# Patient Record
Sex: FEMALE | Race: WHITE | URBAN - NONMETROPOLITAN AREA
[De-identification: names, ages, dates, MRNs, and addresses within clinical notes are randomized per-mention and may not be internally consistent; named-entity substitution may affect disease eponyms.]

---

## 2019-08-21 ENCOUNTER — OFFICE VISIT (OUTPATIENT)
Dept: FAMILY MEDICINE | Facility: OTHER | Age: 59
End: 2019-08-21
Attending: NURSE PRACTITIONER
Payer: COMMERCIAL

## 2019-08-21 VITALS
DIASTOLIC BLOOD PRESSURE: 70 MMHG | HEART RATE: 80 BPM | SYSTOLIC BLOOD PRESSURE: 132 MMHG | BODY MASS INDEX: 36.91 KG/M2 | TEMPERATURE: 98.5 F | HEIGHT: 63 IN | RESPIRATION RATE: 17 BRPM | OXYGEN SATURATION: 96 % | WEIGHT: 208.3 LBS

## 2019-08-21 DIAGNOSIS — N30.00 ACUTE CYSTITIS WITHOUT HEMATURIA: ICD-10-CM

## 2019-08-21 DIAGNOSIS — R39.89 URINARY PROBLEM: Primary | ICD-10-CM

## 2019-08-21 LAB
ALBUMIN UR-MCNC: 100 MG/DL
APPEARANCE UR: ABNORMAL
BACTERIA #/AREA URNS HPF: ABNORMAL /HPF
BILIRUB UR QL STRIP: ABNORMAL
COLOR UR AUTO: YELLOW
GLUCOSE UR STRIP-MCNC: NEGATIVE MG/DL
HGB UR QL STRIP: ABNORMAL
KETONES UR STRIP-MCNC: NEGATIVE MG/DL
LEUKOCYTE ESTERASE UR QL STRIP: ABNORMAL
NITRATE UR QL: POSITIVE
PH UR STRIP: 6 PH (ref 5–9)
RBC #/AREA URNS AUTO: >100 /HPF
SOURCE: ABNORMAL
SP GR UR STRIP: >1.03 (ref 1–1.03)
UROBILINOGEN UR STRIP-ACNC: 1 EU/DL (ref 0.2–1)
WBC #/AREA URNS AUTO: >100 /HPF

## 2019-08-21 PROCEDURE — 81001 URINALYSIS AUTO W/SCOPE: CPT | Mod: ZL | Performed by: NURSE PRACTITIONER

## 2019-08-21 PROCEDURE — 87088 URINE BACTERIA CULTURE: CPT | Mod: ZL | Performed by: NURSE PRACTITIONER

## 2019-08-21 PROCEDURE — 87086 URINE CULTURE/COLONY COUNT: CPT | Mod: ZL | Performed by: NURSE PRACTITIONER

## 2019-08-21 PROCEDURE — 99203 OFFICE O/P NEW LOW 30 MIN: CPT

## 2019-08-21 RX ORDER — FLUTICASONE PROPIONATE 110 UG/1
2 AEROSOL, METERED RESPIRATORY (INHALATION) 2 TIMES DAILY
COMMUNITY
Start: 2019-07-30

## 2019-08-21 RX ORDER — LEVOTHYROXINE SODIUM 75 UG/1
75 TABLET ORAL DAILY
COMMUNITY
Start: 2019-07-09

## 2019-08-21 RX ORDER — PREDNISONE 10 MG/1
40 TABLET ORAL DAILY
COMMUNITY
Start: 2019-07-30

## 2019-08-21 RX ORDER — SULFAMETHOXAZOLE/TRIMETHOPRIM 800-160 MG
1 TABLET ORAL 2 TIMES DAILY
Qty: 6 TABLET | Refills: 0 | Status: SHIPPED | OUTPATIENT
Start: 2019-08-21 | End: 2019-08-24

## 2019-08-21 RX ORDER — ALBUTEROL SULFATE 90 UG/1
1-2 AEROSOL, METERED RESPIRATORY (INHALATION) EVERY 4 HOURS PRN
COMMUNITY
Start: 2019-07-09

## 2019-08-21 ASSESSMENT — PAIN SCALES - GENERAL: PAINLEVEL: NO PAIN (0)

## 2019-08-21 ASSESSMENT — MIFFLIN-ST. JEOR: SCORE: 1483.97

## 2019-08-22 NOTE — PATIENT INSTRUCTIONS
"Patient Education     Bladder Infection, Female (Adult)  Urine is normally doesn't have any bacteria in it. But bacteria can get into the urinary tract from the skin around the rectum. Or they can travel in the blood from elsewhere in the body. Once they are in your urinary tract, they can cause infection in the urethra (urethritis), the bladder (cystitis), or the kidneys (pyelonephritis).  The most common place for an infection is in the bladder. This is called a bladder infection. This is one of the most common infections in women. Most bladder infections are easily treated. They are not serious unless the infection spreads to the kidney.  The phrases \"bladder infection,\" \"UTI,\" and \"cystitis\" are often used to describe the same thing. But they are not always the same. Cystitis is an inflammation of the bladder. The most common cause of cystitis is an infection.  Symptoms  The infection causes inflammation in the urethra and bladder. This causes many of the symptoms. The most common symptoms of a bladder infection are:    Pain or burning when urinating    Having to urinate more often than usual    Urgent need to urinate    Only a small amount of urine comes out    Blood in urine    Abdominal discomfort. This is usually in the lower abdomen above the pubic bone.    Cloudy urine    Strong- or bad-smelling urine    Unable to urinate (urinary retention)    Unable to hold urine in (urinary incontinence)    Fever    Loss of appetite    Confusion (in older adults)  Causes  Bladder infections are not contagious. You can't get one from someone else, from a toilet seat, or from sharing a bath.  The most common cause of bladder infections is bacteria from the bowels. The bacteria get onto the skin around the opening of the urethra. From there, they can get into the urine and travel up to the bladder, causing inflammation and infection. This usually happens because of:    Wiping improperly after urinating. Always wipe from " front to back.    Bowel incontinence    Pregnancy    Procedures such as having a catheter inserted    Older age    Not emptying your bladder. This can allow bacteria a chance to grow in your urine.    Dehydration    Constipation    Sex    Use of a diaphragm for birth control   Treatment  Bladder infections are diagnosed by a urine test. They are treated with antibiotics and usually clear up quickly without complications. Treatment helps prevent a more serious kidney infection.  Medicines  Medicines can help in the treatment of a bladder infection:    Take antibiotics until they are used up, even if you feel better. It is important to finish them to make sure the infection has cleared.    You can use acetaminophen or ibuprofen for pain, fever, or discomfort, unless another medicine was prescribed. If you have chronic liver or kidney disease, talk with your healthcare provider before using these medicines. Also talk with your provider if you've ever had a stomach ulcer or gastrointestinal bleeding, or are taking blood-thinner medicines.    If you are given phenazopydridine to reduce burning with urination, it will cause your urine to become a bright orange color. This can stain clothing.  Care and prevention  These self-care steps can help prevent future infections:    Drink plenty of fluids to prevent dehydration and flush out your bladder. Do this unless you must restrict fluids for other health reasons, or your doctor told you not to.    Proper cleaning after going to the bathroom is important. Wipe from front to back after using the toilet to prevent the spread of bacteria.    Urinate more often. Don't try to hold urine in for a long time.    Wear loose-fitting clothes and cotton underwear. Avoid tight-fitting pants.    Improve your diet and prevent constipation. Eat more fresh fruit and vegetables, and fiber, and less junk and fatty foods.    Avoid sex until your symptoms are gone.    Avoid caffeine, alcohol, and  spicy foods. These can irritate your bladder.    Urinate right after intercourse to flush out your bladder.    If you use birth control pills and have frequent bladder infections, discuss it with your doctor.  Follow-up care  Call your healthcare provider if all symptoms are not gone after 3 days of treatment. This is especially important if you have repeat infections.  If a culture was done, you will be told if your treatment needs to be changed. If directed, you can call to find out the results.  If X-rays were done, you will be told if the results will affect your treatment.  Call 911  Call 911 if any of the following occur:    Trouble breathing    Hard to wake up or confusion    Fainting or loss of consciousness    Rapid heart rate  When to seek medical advice  Call your healthcare provider right away if any of these occur:    Fever of 100.4 F (38.0 C) or higher, or as directed by your healthcare provider    Symptoms are not better by the third day of treatment    Back or belly (abdominal) pain that gets worse    Repeated vomiting, or unable to keep medicine down    Weakness or dizziness    Vaginal discharge    Pain, redness, or swelling in the outer vaginal area (labia)

## 2019-08-22 NOTE — PROGRESS NOTES
"Nursing Notes:   Skylar Waldron LPN  2019  7:04 PM  Signed  Chief Complaint   Patient presents with     Urinary Problem     Patient is here for pain upon urination that started this morning. Patient states that she is also having frequency and urgency. Patient states she used an AZO brand test for UTI that states it was positive. Patient thinks urine looks like it has blood in it.     Initial /70   Pulse 80   Temp 98.5  F (36.9  C) (Tympanic)   Resp 17   Ht 1.6 m (5' 3\")   Wt 94.5 kg (208 lb 4.8 oz)   SpO2 96%   BMI 36.90 kg/m    Estimated body mass index is 36.9 kg/m  as calculated from the following:    Height as of this encounter: 1.6 m (5' 3\").    Weight as of this encounter: 94.5 kg (208 lb 4.8 oz).  Medication Reconciliation: complete    Skylar Waldron LPN    SUBJECTIVE:   Melissa Omalley is a 59 year old female who presents to clinic today for the following health issues:    [facility] symptoms.  Symptoms started this morning.  She is having pain with urination, frequency and urgency.  She has noted mild blood on the toilet paper.  There is no fevers, chills, abdominal pain, back pain.  She denies history of kidney stones.  She has had a history of UTIs.      Problem list and histories reviewed & adjusted, as indicated.  Additional history: as documented    Patient Active Problem List   Diagnosis     Anxiety     CARDIOVASCULAR SCREENING; LDL GOAL LESS THAN 130     Migraine headache     Obesity (BMI 30-39.9)     Anal fissure     Ménière's disease     Abdominal pain, unspecified abdominal location     Numbness and tingling     Past Surgical History:   Procedure Laterality Date     BACK SURGERY       BUNIONECTOMY       C/SECTION, CLASSICAL      , Classical     C/SECTION, CLASSICAL      , Classical     HYSTERECTOMY, PAP NO LONGER INDICATED       HYSTERECTOMY, ASHER      ovary cysts and bleeding     PROCTOPLASTY  10/13/2012    Procedure: " PROCTOPLASTY;   proctoplasty, to repair anal stenosis;  Surgeon: Goldberg, Stanley Morton, MD;  [facility]: [facility]     TONSILLECTOMY         Social History     Tobacco Use     Smoking status: Never Smoker     Smokeless tobacco: Never Used   Substance Use Topics     Alcohol use: No     Family History   Problem Relation Age of Onset     Diabetes Mother      Hypertension Mother      Diabetes Father      Hypertension Father          Current Outpatient Medications   Medication Sig Dispense Refill     albuterol (PROAIR HFA/PROVENTIL HFA/VENTOLIN HFA) 108 (90 Base) MCG/ACT inhaler Inhale 1-2 puffs into the lungs every 4 hours as needed       ALPRAZolam (XANAX) 0.25 MG tablet Take 1 tablet (0.25 mg) by mouth 3 times daily as needed 30 tablet 0     Calcium Carbonate-Vitamin D (CALCIUM 600 + D OR) Take  by mouth.       Cyanocobalamin (VITAMIN B 12 PO) Take 1 tablet by mouth daily       cyclobenzaprine (FLEXERIL) 10 MG tablet Take 1 tablet (10 mg) by mouth 3 times daily as needed for muscle spasms 90 tablet 0     fluticasone (CUTIVATE) 0.05 % cream Apply topically 2 times daily 15 g 0     fluticasone (FLONASE) 50 MCG/ACT nasal spray Spray 2 sprays into both nostrils daily 1 Package 2     fluticasone (FLOVENT HFA) 110 MCG/ACT inhaler Inhale 2 puffs into the lungs 2 times daily       ketoconazole (NIZORAL) 2 % cream Apply bid if needed 45 g 0     levothyroxine (SYNTHROID/LEVOTHROID) 75 MCG tablet Take 75 mcg by mouth daily       Multiple Vitamins-Iron (MULTIVITAMIN/IRON) TABS Take  by mouth.       predniSONE (DELTASONE) 10 MG tablet Take 40 mg by mouth daily       sulfamethoxazole-trimethoprim (BACTRIM DS) 800-160 MG tablet Take 1 tablet by mouth 2 times daily for 3 days 6 tablet 0     meclizine (ANTIVERT) 25 MG tablet Take 25 mg by mouth 3 times daily as needed.       sumatriptan (IMITREX) 100 MG tablet Take 1 tablet by mouth at onset of headache for migraine. May repeat dose in 2 hours.  Do not exceed 200 mg in 24 hours (Patient  "not taking: Reported on 8/21/2019) 6 tablet 1     triamcinolone (KENALOG) 0.1 % cream Apply topically 2 times daily Not to be used more then 10-14 consecutive days, not to be used on face or groin (Patient not taking: Reported on 8/21/2019) 15 g 0     triamterene-hydrochlorothiazide (MAXZIDE-25) 37.5-25 MG per tablet Take 1 tablet by mouth daily (Patient not taking: Reported on 8/21/2019) 90 tablet 1     Zinc 50 MG TABS Take  by mouth.       Allergies   Allergen Reactions     Adhesive Tape      Surgical tape causes blisters     Liquid Adhesive Dermatitis     Surgical tape     Nitroglycerin Other (See Comments)     Facial numbness Rectal ointment for      No Clinical Screening - See Comments      PN: SURGICAL TAPE Reaction :         ROS:  Notable findings in the HPI.       OBJECTIVE:     /70   Pulse 80   Temp 98.5  F (36.9  C) (Tympanic)   Resp 17   Ht 1.6 m (5' 3\")   Wt 94.5 kg (208 lb 4.8 oz)   SpO2 96%   BMI 36.90 kg/m    Body mass index is 36.9 kg/m .  GENERAL: healthy, alert and no distress  EYES: Eyes grossly normal to inspection  RESP: Without increased work of breathing   CV: regular rates and rhythm and no peripheral edema  ABDOMEN: soft, nontender, without hepatosplenomegaly or masses and bowel sounds normal  BACK: no CVA tenderness, no paralumbar tenderness  PSYCH: mentation appears normal, affect normal/bright    Diagnostic Test Results:  Results for orders placed or performed in visit on 08/21/19 (from the past 24 hour(s))   Urinalysis w Reflex Microscopic If Positive   Result Value Ref Range    Color Urine Yellow     Appearance Urine Cloudy     Glucose Urine Negative NEG^Negative mg/dL    Bilirubin Urine Small (A) NEG^Negative    Ketones Urine Negative NEG^Negative mg/dL    Specific Gravity Urine >1.030 (H) 1.000 - 1.030    Blood Urine Large (A) NEG^Negative    pH Urine 6.0 5.0 - 9.0 pH    Protein Albumin Urine 100 (A) NEG^Negative mg/dL    Urobilinogen Urine 1.0 0.2 - 1.0 EU/dL    Nitrite " Urine Positive (A) NEG^Negative    Leukocyte Esterase Urine Moderate (A) NEG^Negative    Source Midstream Urine    Urine Microscopic   Result Value Ref Range    WBC Urine >100 (A) OTO5^0 - 5 /HPF    RBC Urine >100 (A) OTO2^O - 2 /HPF    Bacteria Urine Many (A) NEG^Negative /HPF       ASSESSMENT/PLAN:     1. Urinary problem  - Urinalysis w Reflex Microscopic If Positive  - Urine Microscopic    2. Acute cystitis without hematuria  - Urine Culture Aerobic Bacterial  - sulfamethoxazole-trimethoprim (BACTRIM DS) 800-160 MG tablet; Take 1 tablet by mouth 2 times daily for 3 days  Dispense: 6 tablet; Refill: 0      PLAN:    UTI Adult:  NO Sulfa Allergy: Septra DS one tablet twice daily for 3 days  - Ensure you are drinking plenty of fluids, emptying your bladder when you feel the urge versus holding urine, after urinating you can bear-down to empty bladder completely, after peeing wipe front to back to avoid introducing bacteria towards vaginal area.   - Call or return to clinic prn if these symptoms worsen or fail to improve as anticipated.      Followup:    If not improving or if condition worsens, follow up with your Primary Care Provider    I explained my diagnostic considerations and recommendations to the patient, who voiced understanding and agreement with the treatment plan. All questions were answered. We discussed potential side effects of any prescribed or recommended therapies, as well as expectations for response to treatments. She was advised to contact our office if there is no improvement or worsening of conditions or symptoms.  If s/s worsen or persist, patient will either come back or follow up with PCP.    Disclaimer:  This note consists of words and symbols derived from keyboarding, dictation, or using voice recognition software. As a result, there may be errors in the script that have gone undetected. Please consider this when interpreting information found in this note.      Jo Hernandez NP,  8/21/2019 7:04 PM

## 2019-08-23 LAB
BACTERIA SPEC CULT: ABNORMAL
SPECIMEN SOURCE: ABNORMAL

## 2019-09-27 ENCOUNTER — HOSPITAL ENCOUNTER (EMERGENCY)
Facility: OTHER | Age: 59
Discharge: HOME | End: 2019-09-27
Attending: EMERGENCY MEDICINE | Admitting: EMERGENCY MEDICINE
Payer: COMMERCIAL

## 2019-09-27 VITALS
BODY MASS INDEX: 35.97 KG/M2 | DIASTOLIC BLOOD PRESSURE: 82 MMHG | RESPIRATION RATE: 16 BRPM | OXYGEN SATURATION: 94 % | SYSTOLIC BLOOD PRESSURE: 134 MMHG | TEMPERATURE: 98 F | HEIGHT: 63 IN | WEIGHT: 203 LBS

## 2019-09-27 DIAGNOSIS — R30.0 DYSURIA: ICD-10-CM

## 2019-09-27 LAB
ALBUMIN UR-MCNC: NEGATIVE MG/DL
APPEARANCE UR: CLEAR
BILIRUB UR QL STRIP: NEGATIVE
COLOR UR AUTO: YELLOW
GLUCOSE UR STRIP-MCNC: NEGATIVE MG/DL
HGB UR QL STRIP: NEGATIVE
KETONES UR STRIP-MCNC: NEGATIVE MG/DL
LEUKOCYTE ESTERASE UR QL STRIP: NEGATIVE
NITRATE UR QL: NEGATIVE
PH UR STRIP: 7 PH (ref 5–9)
SOURCE: NORMAL
SP GR UR STRIP: 1.01 (ref 1–1.03)
UROBILINOGEN UR STRIP-ACNC: 0.2 EU/DL (ref 0.2–1)

## 2019-09-27 PROCEDURE — 99282 EMERGENCY DEPT VISIT SF MDM: CPT | Mod: Z6 | Performed by: EMERGENCY MEDICINE

## 2019-09-27 PROCEDURE — 99283 EMERGENCY DEPT VISIT LOW MDM: CPT | Performed by: EMERGENCY MEDICINE

## 2019-09-27 PROCEDURE — 81003 URINALYSIS AUTO W/O SCOPE: CPT | Performed by: EMERGENCY MEDICINE

## 2019-09-27 ASSESSMENT — MIFFLIN-ST. JEOR: SCORE: 1459.93

## 2019-09-27 NOTE — ED AVS SNAPSHOT
[facility]  [address]  [place] [address]  Phone:  [phone]  Fax:  [phone]                                    Melissa Omalley   MRN: [id number]    Department:  [facility]   Date of Visit:  9/27/2019           After Visit Summary Signature Page    I have received my discharge instructions, and my questions have been answered. I have discussed any challenges I see with this plan with the nurse or doctor.    ..........................................................................................................................................  Patient/Patient Representative Signature      ..........................................................................................................................................  Patient Representative Print Name and Relationship to Patient    ..................................................               ................................................  Date                                   Time    ..........................................................................................................................................  Reviewed by Signature/Title    ...................................................              ..............................................  Date                                               Time          22EPIC Rev 08/18

## 2019-09-28 NOTE — ED TRIAGE NOTES
Pt presents to the ER complaining of urinary frequency and burning so she would like to be tested for a UTI.

## 2019-09-28 NOTE — ED PROVIDER NOTES
History     Chief Complaint   Patient presents with     Urinary Frequency     HPI  Melissa Omalley is a 59 year old female who is here concerned about UTI.  She is complaining of frequency and burning but no other symptoms.  Denies flank pain fevers chills nausea vomiting.  Not feeling ill otherwise.    Allergies:  Allergies   Allergen Reactions     Adhesive Tape      Surgical tape causes blisters     Liquid Adhesive Dermatitis     Surgical tape     Nitroglycerin Other (See Comments)     Facial numbness Rectal ointment for      No Clinical Screening - See Comments      PN: SURGICAL TAPE Reaction :       Problem List:    Patient Active Problem List    Diagnosis Date Noted     Abdominal pain, unspecified abdominal location 2013     Priority: Medium     Problem list name updated by automated process. Provider to review       Numbness and tingling 2013     Priority: Medium     Ménière's disease      Priority: Medium     Anal fissure 2012     Priority: Medium     Obesity (BMI 30-39.9) 2012     Priority: Medium     Migraine headache 2012     Priority: Medium     Anxiety 2011     Priority: Medium     CARDIOVASCULAR SCREENING; LDL GOAL LESS THAN 130 2011     Priority: Medium        Past Medical History:    Past Medical History:   Diagnosis Date     Anal fissure      Anxiety 2011     Migraine      Ménière's disease 2011     Obesity (BMI 30-39.9)        Past Surgical History:    Past Surgical History:   Procedure Laterality Date     BACK SURGERY       BUNIONECTOMY       C/SECTION, CLASSICAL      , Classical     C/SECTION, CLASSICAL      , Classical     HYSTERECTOMY, PAP NO LONGER INDICATED       HYSTERECTOMY, ASHER      ovary cysts and bleeding     PROCTOPLASTY  10/13/2012    Procedure: PROCTOPLASTY;   proctoplasty, to repair anal stenosis;  Surgeon: Goldberg, Stanley Morton, MD;  Location: [facility]     TONSILLECTOMY         Family History:    Family History  "  Problem Relation Age of Onset     Diabetes Mother      Hypertension Mother      Diabetes Father      Hypertension Father        Social History:  Marital Status:   [2]  Social History     Tobacco Use     Smoking status: Never Smoker     Smokeless tobacco: Never Used   Substance Use Topics     Alcohol use: No     Drug use: No        Medications:    albuterol (PROAIR HFA/PROVENTIL HFA/VENTOLIN HFA) 108 (90 Base) MCG/ACT inhaler  ALPRAZolam (XANAX) 0.25 MG tablet  Calcium Carbonate-Vitamin D (CALCIUM 600 + D OR)  Cyanocobalamin (VITAMIN B 12 PO)  cyclobenzaprine (FLEXERIL) 10 MG tablet  fluticasone (CUTIVATE) 0.05 % cream  fluticasone (FLONASE) 50 MCG/ACT nasal spray  fluticasone (FLOVENT HFA) 110 MCG/ACT inhaler  ketoconazole (NIZORAL) 2 % cream  levothyroxine (SYNTHROID/LEVOTHROID) 75 MCG tablet  meclizine (ANTIVERT) 25 MG tablet  Multiple Vitamins-Iron (MULTIVITAMIN/IRON) TABS  predniSONE (DELTASONE) 10 MG tablet  sumatriptan (IMITREX) 100 MG tablet  triamcinolone (KENALOG) 0.1 % cream  triamterene-hydrochlorothiazide (MAXZIDE-25) 37.5-25 MG per tablet  Zinc 50 MG TABS          Review of Systems   All other systems reviewed and are negative.      Physical Exam   BP: (!) 150/64  Heart Rate: 74  Temp: 98  F (36.7  C)  Resp: 16  Height: 160 cm (5' 3\")  Weight: 92.1 kg (203 lb)  SpO2: 95 %      Physical Exam  HENT:      Head: Normocephalic.   Eyes:      Conjunctiva/sclera: Conjunctivae normal.   Cardiovascular:      Rate and Rhythm: Normal rate.   Pulmonary:      Effort: Pulmonary effort is normal.   Skin:     General: Skin is warm and dry.   Neurological:      General: No focal deficit present.      Mental Status: She is alert and oriented to person, place, and time.   Psychiatric:         Mood and Affect: Mood normal.         Behavior: Behavior normal.         ED Course        Procedures               Results for orders placed or performed during the hospital encounter of 09/27/19 (from the past 24 hour(s)) "   *UA reflex to Microscopic   Result Value Ref Range    Color Urine Yellow     Appearance Urine Clear     Glucose Urine Negative NEG^Negative mg/dL    Bilirubin Urine Negative NEG^Negative    Ketones Urine Negative NEG^Negative mg/dL    Specific Gravity Urine 1.010 1.000 - 1.030    Blood Urine Negative NEG^Negative    pH Urine 7.0 5.0 - 9.0 pH    Protein Albumin Urine Negative NEG^Negative mg/dL    Urobilinogen Urine 0.2 0.2 - 1.0 EU/dL    Nitrite Urine Negative NEG^Negative    Leukocyte Esterase Urine Negative NEG^Negative    Source Midstream Urine        Medications - No data to display    Assessments & Plan (with Medical Decision Making)     I have reviewed the nursing notes.    I have reviewed the findings, diagnosis, plan and need for follow up with the patient.  Urine is clear with no sign of infection.  She is reassured and discharged home.  Follow-up if worse or not improving.    Discharge Medication List as of 9/27/2019 10:52 PM          Final diagnoses:   Dysuria       9/27/2019   [facility]     Mukund Majano MD  09/28/19 0650       Mukund Majano MD  09/28/19 0786

## 2019-10-01 ENCOUNTER — HEALTH MAINTENANCE LETTER (OUTPATIENT)
Age: 59
End: 2019-10-01

## 2021-01-15 ENCOUNTER — HEALTH MAINTENANCE LETTER (OUTPATIENT)
Age: 61
End: 2021-01-15

## 2021-09-04 ENCOUNTER — HEALTH MAINTENANCE LETTER (OUTPATIENT)
Age: 61
End: 2021-09-04

## 2021-10-30 ENCOUNTER — HEALTH MAINTENANCE LETTER (OUTPATIENT)
Age: 61
End: 2021-10-30

## 2022-02-19 ENCOUNTER — HEALTH MAINTENANCE LETTER (OUTPATIENT)
Age: 62
End: 2022-02-19

## 2022-10-16 ENCOUNTER — HEALTH MAINTENANCE LETTER (OUTPATIENT)
Age: 62
End: 2022-10-16

## 2023-04-01 ENCOUNTER — HEALTH MAINTENANCE LETTER (OUTPATIENT)
Age: 63
End: 2023-04-01

## 2023-11-04 ENCOUNTER — HEALTH MAINTENANCE LETTER (OUTPATIENT)
Age: 63
End: 2023-11-04